# Patient Record
Sex: MALE
[De-identification: names, ages, dates, MRNs, and addresses within clinical notes are randomized per-mention and may not be internally consistent; named-entity substitution may affect disease eponyms.]

---

## 2017-09-28 ENCOUNTER — TRANSCRIPTION ENCOUNTER (OUTPATIENT)
Age: 42
End: 2017-09-28

## 2017-12-21 ENCOUNTER — TRANSCRIPTION ENCOUNTER (OUTPATIENT)
Age: 42
End: 2017-12-21

## 2018-08-28 ENCOUNTER — TRANSCRIPTION ENCOUNTER (OUTPATIENT)
Age: 43
End: 2018-08-28

## 2019-02-22 ENCOUNTER — TRANSCRIPTION ENCOUNTER (OUTPATIENT)
Age: 44
End: 2019-02-22

## 2019-04-12 ENCOUNTER — TRANSCRIPTION ENCOUNTER (OUTPATIENT)
Age: 44
End: 2019-04-12

## 2021-09-25 ENCOUNTER — TRANSCRIPTION ENCOUNTER (OUTPATIENT)
Age: 46
End: 2021-09-25

## 2022-05-16 PROBLEM — Z00.00 ENCOUNTER FOR PREVENTIVE HEALTH EXAMINATION: Status: ACTIVE | Noted: 2022-05-16

## 2022-05-19 ENCOUNTER — APPOINTMENT (OUTPATIENT)
Dept: UROLOGY | Facility: CLINIC | Age: 47
End: 2022-05-19
Payer: COMMERCIAL

## 2022-05-19 VITALS
DIASTOLIC BLOOD PRESSURE: 67 MMHG | HEART RATE: 16 BPM | HEIGHT: 74 IN | TEMPERATURE: 98.1 F | WEIGHT: 200 LBS | BODY MASS INDEX: 25.67 KG/M2 | SYSTOLIC BLOOD PRESSURE: 100 MMHG

## 2022-05-19 DIAGNOSIS — Z78.9 OTHER SPECIFIED HEALTH STATUS: ICD-10-CM

## 2022-05-19 DIAGNOSIS — R35.1 NOCTURIA: ICD-10-CM

## 2022-05-19 DIAGNOSIS — R39.9 UNSPECIFIED SYMPTOMS AND SIGNS INVOLVING THE GENITOURINARY SYSTEM: ICD-10-CM

## 2022-05-19 DIAGNOSIS — N48.6 INDURATION PENIS PLASTICA: ICD-10-CM

## 2022-05-19 DIAGNOSIS — Z83.3 FAMILY HISTORY OF DIABETES MELLITUS: ICD-10-CM

## 2022-05-19 PROCEDURE — 99204 OFFICE O/P NEW MOD 45 MIN: CPT

## 2022-05-19 RX ORDER — TAMSULOSIN HYDROCHLORIDE 0.4 MG/1
0.4 CAPSULE ORAL
Qty: 30 | Refills: 1 | Status: ACTIVE | COMMUNITY
Start: 2022-05-19 | End: 1900-01-01

## 2022-05-20 ENCOUNTER — NON-APPOINTMENT (OUTPATIENT)
Age: 47
End: 2022-05-20

## 2022-05-20 PROBLEM — Z78.9 NO PERTINENT PAST MEDICAL HISTORY: Status: RESOLVED | Noted: 2022-05-20 | Resolved: 2022-05-20

## 2022-05-20 PROBLEM — Z83.3 FAMILY HISTORY OF DIABETES MELLITUS: Status: ACTIVE | Noted: 2022-05-20

## 2022-05-20 PROBLEM — R35.1 NOCTURIA: Status: ACTIVE | Noted: 2022-05-20

## 2022-05-20 PROBLEM — Z78.9 NON-SMOKER: Status: ACTIVE | Noted: 2022-05-19

## 2022-05-20 NOTE — PHYSICAL EXAM
[General Appearance - Well Developed] : well developed [General Appearance - Well Nourished] : well nourished [Normal Appearance] : normal appearance [Well Groomed] : well groomed [General Appearance - In No Acute Distress] : no acute distress [Edema] : no peripheral edema [Respiration, Rhythm And Depth] : normal respiratory rhythm and effort [Exaggerated Use Of Accessory Muscles For Inspiration] : no accessory muscle use [Abdomen Soft] : soft [Abdomen Tenderness] : non-tender [Costovertebral Angle Tenderness] : no ~M costovertebral angle tenderness [Urethral Meatus] : meatus normal [Penis Abnormality] : normal circumcised penis [Scrotum] : the scrotum was normal [Testes Tenderness] : no tenderness of the testes [Testes Mass (___cm)] : there were no testicular masses [FreeTextEntry1] : declined prostate exam, Peyronie's plague on dorsal mid shaft ~1cm [Normal Station and Gait] : the gait and station were normal for the patient's age [] : no rash [No Focal Deficits] : no focal deficits [Oriented To Time, Place, And Person] : oriented to person, place, and time [Affect] : the affect was normal [Mood] : the mood was normal [Not Anxious] : not anxious

## 2022-05-20 NOTE — HISTORY OF PRESENT ILLNESS
[FreeTextEntry1] : 47yo M presents for urinary frequency, hesitancy, weak stream, intermittency, straining ~ 5 years. \par Frequency 5x / day, nocturia 6-7x \par He has tried finasteride but stopped due to sexual side effects. \par Denies dysuria or hematuria.  \par \par Pt reports using testosterone injections every 2 weeks he buys online. \par Pt also report Peyronie's disease. He reports he has dorsal curvature ~30 degrees. Has slight pain with intercourse but tolerable

## 2022-05-20 NOTE — ASSESSMENT
[FreeTextEntry1] : LUTS: \par check culture\par start tamsulosin\par f/u 1 month\par \par He will stop testosterone supplement \par will discuss BW when he returns in 1 month\par \par Peyronie's Disease: \par discussed collagenase injections vs surgical intervention\par Discussed risks and benefit of xiaflex\par Pt would like to wait and monitor

## 2022-05-23 LAB — BACTERIA UR CULT: NORMAL

## 2022-05-24 ENCOUNTER — APPOINTMENT (OUTPATIENT)
Dept: UROLOGY | Facility: CLINIC | Age: 47
End: 2022-05-24

## 2022-06-19 ENCOUNTER — NON-APPOINTMENT (OUTPATIENT)
Age: 47
End: 2022-06-19

## 2022-06-21 ENCOUNTER — APPOINTMENT (OUTPATIENT)
Dept: UROLOGY | Facility: CLINIC | Age: 47
End: 2022-06-21

## 2022-06-27 ENCOUNTER — APPOINTMENT (OUTPATIENT)
Dept: UROLOGY | Facility: CLINIC | Age: 47
End: 2022-06-27

## 2023-02-06 ENCOUNTER — NON-APPOINTMENT (OUTPATIENT)
Age: 48
End: 2023-02-06

## 2023-09-08 ENCOUNTER — NON-APPOINTMENT (OUTPATIENT)
Age: 48
End: 2023-09-08

## 2023-09-15 ENCOUNTER — APPOINTMENT (OUTPATIENT)
Dept: PHYSICAL MEDICINE AND REHAB | Facility: CLINIC | Age: 48
End: 2023-09-15
Payer: COMMERCIAL

## 2023-09-15 VITALS
DIASTOLIC BLOOD PRESSURE: 70 MMHG | RESPIRATION RATE: 99 BRPM | BODY MASS INDEX: 26.31 KG/M2 | WEIGHT: 205 LBS | HEIGHT: 74 IN | SYSTOLIC BLOOD PRESSURE: 110 MMHG

## 2023-09-15 VITALS — BODY MASS INDEX: 26.31 KG/M2 | HEIGHT: 74 IN | WEIGHT: 205 LBS

## 2023-09-15 PROCEDURE — 99204 OFFICE O/P NEW MOD 45 MIN: CPT

## 2023-09-15 PROCEDURE — 76882 US LMTD JT/FCL EVL NVASC XTR: CPT | Mod: LT

## 2023-09-26 ENCOUNTER — NON-APPOINTMENT (OUTPATIENT)
Age: 48
End: 2023-09-26

## 2023-10-02 ENCOUNTER — APPOINTMENT (OUTPATIENT)
Dept: PHYSICAL MEDICINE AND REHAB | Facility: CLINIC | Age: 48
End: 2023-10-02
Payer: COMMERCIAL

## 2023-10-02 VITALS
SYSTOLIC BLOOD PRESSURE: 117 MMHG | HEIGHT: 75 IN | DIASTOLIC BLOOD PRESSURE: 57 MMHG | HEART RATE: 108 BPM | WEIGHT: 210 LBS | BODY MASS INDEX: 26.11 KG/M2

## 2023-10-02 DIAGNOSIS — S76.112A STRAIN OF LEFT QUADRICEPS MUSCLE, FASCIA AND TENDON, INITIAL ENCOUNTER: ICD-10-CM

## 2023-10-02 PROCEDURE — 99214 OFFICE O/P EST MOD 30 MIN: CPT

## 2023-10-16 ENCOUNTER — APPOINTMENT (OUTPATIENT)
Dept: ORTHOPEDIC SURGERY | Facility: CLINIC | Age: 48
End: 2023-10-16
Payer: COMMERCIAL

## 2023-10-16 VITALS — WEIGHT: 210 LBS | BODY MASS INDEX: 26.11 KG/M2 | HEIGHT: 75 IN

## 2023-10-16 PROCEDURE — 99204 OFFICE O/P NEW MOD 45 MIN: CPT

## 2023-11-10 ENCOUNTER — APPOINTMENT (OUTPATIENT)
Dept: UROLOGY | Facility: CLINIC | Age: 48
End: 2023-11-10

## 2023-11-20 ENCOUNTER — APPOINTMENT (OUTPATIENT)
Dept: ORTHOPEDIC SURGERY | Facility: CLINIC | Age: 48
End: 2023-11-20
Payer: COMMERCIAL

## 2023-11-20 DIAGNOSIS — S76.112D STRAIN OF LEFT QUADRICEPS MUSCLE, FASCIA AND TENDON, SUBSEQUENT ENCOUNTER: ICD-10-CM

## 2023-11-20 DIAGNOSIS — S83.242A OTHER TEAR OF MEDIAL MENISCUS, CURRENT INJURY, LEFT KNEE, INITIAL ENCOUNTER: ICD-10-CM

## 2023-11-20 PROCEDURE — 99214 OFFICE O/P EST MOD 30 MIN: CPT

## 2024-01-29 ENCOUNTER — APPOINTMENT (OUTPATIENT)
Dept: ORTHOPEDIC SURGERY | Facility: CLINIC | Age: 49
End: 2024-01-29

## 2024-01-31 ENCOUNTER — APPOINTMENT (OUTPATIENT)
Dept: ORTHOPEDIC SURGERY | Facility: CLINIC | Age: 49
End: 2024-01-31
Payer: COMMERCIAL

## 2024-01-31 DIAGNOSIS — S80.01XA CONTUSION OF RIGHT KNEE, INITIAL ENCOUNTER: ICD-10-CM

## 2024-01-31 DIAGNOSIS — S80.02XA CONTUSION OF LEFT KNEE, INITIAL ENCOUNTER: ICD-10-CM

## 2024-01-31 PROCEDURE — 99213 OFFICE O/P EST LOW 20 MIN: CPT

## 2024-01-31 NOTE — PHYSICAL EXAM
[de-identified] : Physical Exam:  General: Well appearing, no acute distress  Neurologic: A&Ox3, No focal deficits  Head: NCAT without abrasions, lacerations, or ecchymosis to head, face, or scalp  Eyes: No scleral icterus, no gross abnormalities  Respiratory: Equal chest wall expansion bilaterally, no accessory muscle use  Lymphatic: No lymphadenopathy palpated  Skin: Warm and dry  Psychiatric: Normal mood and affect  Both knees are examined and there are abrasions in the anterior medial knee just superior medial to the patellas.  There is surrounding ecchymosis.  There is no effusion noted on either knee and there is no palpable defects to the patella or the quad tendon.  He can straight leg raise both knees.  He has range of motion from 0 to 130 degrees with mild pain on maximal flexion.  He appears stable to valgus and varus stress, Lachman testing, and anterior posterior drawer.  His calf and thigh are soft and nontender.  He is grossly neurovascular intact distally. [de-identified] : Patient defers x-rays today in the office.

## 2024-01-31 NOTE — HISTORY OF PRESENT ILLNESS
[de-identified] : ABIEL RAMÍREZ is a 48 year old male being seen for  bilateral knee pain.  He states 3 days ago he was moving a bed frame when it slid down and struck both of his knees.  He suffered abrasions and also struck his face.  He denies any buckling or instability.  He is able to ambulate without assistive devices.  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

## 2024-01-31 NOTE — DISCUSSION/SUMMARY
[de-identified] : I had a lengthy discussion with the patient regarding his condition.  We discussed the treatment plan.  He would like to avoid taking x-rays in the office.  I discussed the potential for fracture.  He is ligamentously stable.  I instructed him on cryotherapy.  He will continue with ibuprofen for pain relief.  He will follow-up in 2 weeks if still symptomatic, or otherwise as needed.  All of his questions were answered.

## 2024-03-20 ENCOUNTER — APPOINTMENT (OUTPATIENT)
Dept: ORTHOPEDIC SURGERY | Facility: CLINIC | Age: 49
End: 2024-03-20
Payer: COMMERCIAL

## 2024-03-20 PROCEDURE — 72070 X-RAY EXAM THORAC SPINE 2VWS: CPT

## 2024-03-20 PROCEDURE — 99213 OFFICE O/P EST LOW 20 MIN: CPT

## 2024-03-20 NOTE — PHYSICAL EXAM
[de-identified] : Physical Exam:  General: Well appearing, no acute distress  Neurologic: A&Ox3, No focal deficits  Head: NCAT without abrasions, lacerations, or ecchymosis to head, face, or scalp  Eyes: No scleral icterus, no gross abnormalities  Respiratory: Equal chest wall expansion bilaterally, no accessory muscle use  Lymphatic: No lymphadenopathy palpated  Skin: Warm and dry  Psychiatric: Normal mood and affect  Thoracolumbar spine is examined and reveals no deformity. Patient has no midline tenderness to palpation. The patient has bilateral paraspinal tenderness in the midthoracic region and around the periscapular region. Trunk flexion to 60 degrees. Bilateral trunk rotation to 25 degrees. Bilateral side bending to 25 degrees. Extension to 10 degrees. No pain with extension. Patient has pain with straight leg raising but negative straight leg raise signs bilaterally. Straight leg raise is to 35 degrees bilaterally. No pain with logrolling either hip. No pain with hip flexion, IR/ER. Negative CHINO and FADIR tests, as well as negative Figure 4 test. 5/5 motor strength to all major muscle groups of the bilateral lower extremity. 2+ deep tendon reflexes in the bilateral lower extremity which are symmetric. Sensation is intact throughout the bilateral lower extremity. Calves and thighs are soft and nontender bilaterally. 2+ DP pulses. [de-identified] : 2 views of the thoracic spine were performed today and available for me to review. Results were discussed with the patient. They demonstrate no f/x, dislocation or other deformity.

## 2024-03-20 NOTE — HISTORY OF PRESENT ILLNESS
[de-identified] : ABIEL RAMÍREZ is a 48 year male being seen for f/u visit, new thoracic spine pain. He admits to having poor upper extremity posture. He endorses pain surrounding bilateral scapular blades. He is interesting in receiving trigger point injections.  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  He denies paresthesias or any change in bowel and bladder.  He has had this going on for years but has gotten much worse.  He is interested in injection therapy.  He says he has a history of scoliosis.

## 2024-03-20 NOTE — DISCUSSION/SUMMARY
[de-identified] : I had a lengthy discussion with the patient regarding their current condition. We discussed the treatment options including operative and nonoperative management. At this time I recommended a course of physical therapy as well as massage therapy.  I am referring him to Dr. Fuller for consultation regarding potential injection therapy at his request.  He will follow-up with me as needed.  All questions were answered.

## 2024-03-21 ENCOUNTER — APPOINTMENT (OUTPATIENT)
Dept: PHYSICAL MEDICINE AND REHAB | Facility: CLINIC | Age: 49
End: 2024-03-21
Payer: COMMERCIAL

## 2024-03-21 VITALS
RESPIRATION RATE: 15 BRPM | HEART RATE: 92 BPM | SYSTOLIC BLOOD PRESSURE: 99 MMHG | HEIGHT: 75 IN | DIASTOLIC BLOOD PRESSURE: 64 MMHG | BODY MASS INDEX: 25.49 KG/M2 | WEIGHT: 205 LBS

## 2024-03-21 DIAGNOSIS — M47.814 SPONDYLOSIS W/OUT MYELOPATHY OR RADICULOPATHY, THORACIC REGION: ICD-10-CM

## 2024-03-21 PROCEDURE — 20553 NJX 1/MLT TRIGGER POINTS 3/>: CPT

## 2024-03-21 PROCEDURE — 99204 OFFICE O/P NEW MOD 45 MIN: CPT | Mod: 25

## 2024-03-21 NOTE — DATA REVIEWED
[FreeTextEntry1] : X-ray T Spine 3/20/24 reviewed and interpreted by me: no acutely displaced fractures

## 2024-03-21 NOTE — ASSESSMENT
[FreeTextEntry1] : Mr. ABIEL RAMÍREZ is a 48 year old male who presents with persistent midback pain, likely myofascial in nature as well as due to early spondylosis. Denies any red flag signs. Will recommend: - X-ray T Spine reviewed - TPI performed today, tolerated well - Methocarbamol 500mg Qhs PRN. Advised of side effects including sedation.  - Can consider advanced imaging of T Spine at next visit if pain continues.   RTC in 5-6 weeks. Patient aware of red flag signs including any changes to their bowel/bladder control, groin numbness or new weakness. Patient knows to seek immediate attention by calling 911 or going to nearest ER if these symptoms appear.

## 2024-03-21 NOTE — PROCEDURE
[de-identified] : Reason for procedure: Myalgia  Procedure: Trigger Point Injections Physician: Dr. Fuller Medication injected: Lidocaine 1%, 5cc total Sedation medications: None Estimated blood loss: None Complications: None  Technique: R/B/A to trigger point injection reviewed with patient. The patient is agreeable and wishes to proceed.   The patient was placed in prone position and trigger points of the bilateral trapezius, rhomboids and thoracic paraspinals were identified. The area was prepped in normal sterile fashion with Chloroprep. A 27 gauge 1.25 inch needle was advanced into the palpable trigger points with reproduction of pain. After negative aspiration of heme, the above medications were injected into the trigger areas. Needle was then removed, bandaid placed over injection sites. There was no complications, the patient was provided with post injection instructions.

## 2024-03-21 NOTE — PHYSICAL EXAM
[FreeTextEntry1] : PE: Constitutional: In NAD, calm and cooperative MSK (Back)  Inspection: no gross swelling identified  Palpation: Tenderness of the over the bilateral thoracic paraspinals, rhomboids and lower trapezius  ROM: Pain at end lumbar extension/flexion  Strength: 5/5 strength in bilateral lower extremities  Reflexes: 2+ Patella reflex bilaterally, 2+ Achilles reflex bilaterally, negative clonus bilaterally  Sensation: Intact to light touch in bilateral lower extremities Special tests: SLR: negative bilaterally CHINO: negative bilaterally FADIR: negative bilaterally Facet loading: positive bilaterally

## 2024-03-21 NOTE — HISTORY OF PRESENT ILLNESS
[FreeTextEntry1] : Mr. ABIEL RAMÍREZ is a 48 year old male who presents with mid back pain.   Location: Bilateral midback Onset: For years, no inciting event Provocation/Palliative: Worse with activity, better with rest Quality:Spasm Radiation: None Severity: 4/10 Timing: Constant   Denies any associated numbness. Denies any associated leg weakness. Denies any loss of bowel/bladder control or any groin numbness. Previous medications trialed: Tylenol/Advil without much help Previous procedures relevant to complaint: None Conservative therapy tried?:  No recent PT but completes a HEP daily consisting of thoracic stretching

## 2024-04-25 ENCOUNTER — APPOINTMENT (OUTPATIENT)
Dept: PHYSICAL MEDICINE AND REHAB | Facility: CLINIC | Age: 49
End: 2024-04-25
Payer: COMMERCIAL

## 2024-04-25 VITALS
DIASTOLIC BLOOD PRESSURE: 70 MMHG | HEIGHT: 75 IN | BODY MASS INDEX: 25.49 KG/M2 | WEIGHT: 205 LBS | OXYGEN SATURATION: 98 % | RESPIRATION RATE: 14 BRPM | SYSTOLIC BLOOD PRESSURE: 111 MMHG | HEART RATE: 80 BPM

## 2024-04-25 DIAGNOSIS — M79.10 MYALGIA, UNSPECIFIED SITE: ICD-10-CM

## 2024-04-25 DIAGNOSIS — M79.18 MYALGIA, OTHER SITE: ICD-10-CM

## 2024-04-25 DIAGNOSIS — M54.6 PAIN IN THORACIC SPINE: ICD-10-CM

## 2024-04-25 PROCEDURE — 99213 OFFICE O/P EST LOW 20 MIN: CPT | Mod: 25

## 2024-04-25 PROCEDURE — 20553 NJX 1/MLT TRIGGER POINTS 3/>: CPT

## 2024-04-25 NOTE — ASSESSMENT
[FreeTextEntry1] : Mr. ABIEL RAMÍREZ is a 48 year old male who presents with persistent midback pain, likely myofascial in nature as well as due to early spondylosis. Denies any red flag signs. Will recommend: - TPI performed today, tolerated well - Continue Methocarbamol 500mg Qhs PRN. Advised of side effects including sedation. - Given persistent pain, will order MRI T Spine  RTC in 5-6 weeks. Patient aware of red flag signs including any changes to their bowel/bladder control, groin numbness or new weakness. Patient knows to seek immediate attention by calling 911 or going to nearest ER if these symptoms appear.

## 2024-04-25 NOTE — HISTORY OF PRESENT ILLNESS
[FreeTextEntry1] : Mr. ABIEL RAMÍREZ is a 48 year old male who presents for follow up. At last visit, he was given a TPI, given Robaxin and spoke about MRI T Spine if pain continued. He says it helped for a few weeks until pain returned. Denies any new symptoms, just worsening pain.    Location: Bilateral midback Onset: For years, no inciting event Provocation/Palliative: Worse with activity, better with rest Quality:Spasm Radiation: None Severity: 4-5/10 Timing: Constant  No bowel/bladder changes. No groin numbness.

## 2024-04-25 NOTE — PHYSICAL EXAM
[FreeTextEntry1] : PE: Constitutional: In NAD, calm and cooperative MSK (Back)  Inspection: no gross swelling identified  Palpation: Tenderness of the over the bilateral thoracic paraspinals, rhomboids and lower trapezius  ROM: Pain at end lumbar extension/flexion  Strength: 5/5 strength in bilateral lower extremities  Reflexes: 2+ Patella reflex bilaterally, 2+ Achilles reflex bilaterally, negative clonus bilaterally  Sensation: Intact to light touch in bilateral lower extremities Special tests: SLR: negative bilaterally CHINO: negative bilaterally FADIR: negative bilaterally Facet loading: positive bilaterally.

## 2024-04-25 NOTE — PROCEDURE
[de-identified] : Reason for procedure: Myalgia  Procedure: Trigger Point Injections Physician: Dr. Fuller Medication injected: Lidocaine 1%, 5cc total Sedation medications: None Estimated blood loss: None Complications: None  Technique: R/B/A to trigger point injection reviewed with patient. The patient is agreeable and wishes to proceed.   The patient was placed in prone position and trigger points of the bilateral trapezius, rhomboids and thoracic paraspinals were identified. The area was prepped in normal sterile fashion with Chloroprep. A 27 gauge 1.25 inch needle was advanced into the palpable trigger points with reproduction of pain. After negative aspiration of heme, the above medications were injected into the trigger areas. Needle was then removed, bandaid placed over injection sites. There was no complications, the patient was provided with post injection instructions.

## 2024-05-06 ENCOUNTER — RX RENEWAL (OUTPATIENT)
Age: 49
End: 2024-05-06

## 2024-05-06 RX ORDER — METHOCARBAMOL 500 MG/1
500 TABLET, FILM COATED ORAL
Qty: 30 | Refills: 0 | Status: ACTIVE | COMMUNITY
Start: 2024-03-21 | End: 1900-01-01

## 2024-05-30 ENCOUNTER — APPOINTMENT (OUTPATIENT)
Dept: PHYSICAL MEDICINE AND REHAB | Facility: CLINIC | Age: 49
End: 2024-05-30

## 2024-08-14 ENCOUNTER — APPOINTMENT (OUTPATIENT)
Dept: ORTHOPEDIC SURGERY | Facility: CLINIC | Age: 49
End: 2024-08-14